# Patient Record
Sex: FEMALE | Race: WHITE | NOT HISPANIC OR LATINO
[De-identification: names, ages, dates, MRNs, and addresses within clinical notes are randomized per-mention and may not be internally consistent; named-entity substitution may affect disease eponyms.]

---

## 2022-06-16 PROBLEM — Z00.00 ENCOUNTER FOR PREVENTIVE HEALTH EXAMINATION: Status: ACTIVE | Noted: 2022-06-16

## 2022-06-30 ENCOUNTER — APPOINTMENT (OUTPATIENT)
Dept: ORTHOPEDIC SURGERY | Facility: CLINIC | Age: 37
End: 2022-06-30

## 2022-06-30 VITALS — BODY MASS INDEX: 25.11 KG/M2 | HEIGHT: 67 IN | WEIGHT: 160 LBS

## 2022-06-30 DIAGNOSIS — Z78.9 OTHER SPECIFIED HEALTH STATUS: ICD-10-CM

## 2022-06-30 DIAGNOSIS — M25.512 PAIN IN LEFT SHOULDER: ICD-10-CM

## 2022-06-30 PROCEDURE — 99455 WORK RELATED DISABILITY EXAM: CPT

## 2022-06-30 PROCEDURE — 99072 ADDL SUPL MATRL&STAF TM PHE: CPT

## 2022-07-01 PROBLEM — M25.512 ACUTE PAIN OF LEFT SHOULDER: Status: ACTIVE | Noted: 2022-06-30

## 2022-07-01 PROBLEM — Z78.9 NON-SMOKER: Status: ACTIVE | Noted: 2022-06-30

## 2022-07-01 NOTE — HISTORY OF PRESENT ILLNESS
[de-identified] : The patient is a 36 year old R hand dominant female who presents today complaining of left shoulder pain. \par Date of Injury/Onset: 11/9/20\par Pain: At Rest: 6/10\par With Activity: 7/10\par Mechanism of injury: At her job they have stocked boxes full of heavy objects such as milks. She pulled something out\par of her walk in fridge and the boxes came falling onto her L side of her body\par This is a Work Related Injury being treated under Worker's Compensation.\par This is not an athletic injury occurring associated with an interscholastic or organized sports team.\par Quality of symptoms: shooting pain, numbness in the hand\par Improves with: chiropractor, shock treatment \par Worse with: weather, sleeping, reaching behind, lifting objects\par Treatment/Imaging/Studies Since Last Visit: PT and MDP\par Reports Available For Review Today: None\par Out of work/sport: n/a\par School/Sport/Position/Occupation: working/ \par Change since last visit: Pain is more mild that last visit in january. \par Additional Information: More numbness than pain.

## 2022-07-01 NOTE — WORK
[Has the patient reached Maximum Medical Improvement? If yes, indicate date___] : Yes, on [unfilled] [Is there permanent partial impairment?] : Yes [FreeTextEntry6] : Left shoulder [Left] : left [Yes] : Yes [FreeTextEntry7] : Shoulder [FreeTextEntry8] : See above [de-identified] : Full [FreeTextEntry5] : 20% [de-identified] : Loss of motion, IR/F/AB 15%, pain and weakness 5%, total 20% [At the pre-injury job] : At the pre-injury job [___ lbs] : Frequently: [unfilled] lbs [] : Constantly [N/A] : N/A [Heavy Work] : Heavy work [Has the patient had an injury/illness since the date of injury which impacts residual functional capacity?] : No [Would the patient benefit from vocational rehabilitation? If Yes, explain below.] :  No

## 2022-07-01 NOTE — IMAGING
[de-identified] : The patient is a well appearing 37 year old F of their stated age.\par Neck is supple & nontender to palpation. + Spurling's left.\par \par Effected Shoulder:  \par Inspection:\par Scapula Winging: Negative\par Deformity: None\par Erythema: None\par Ecchymosis: None\par Abrasions: None\par Effusion: None\par \par Range of Motion:\par Active Forward Flexion: 150 degrees \par Active Abduction: 150 degrees \par Passive Forward Flexion: 175 degrees \par Passive Abduction: 175 degrees \par ER @ 90 degrees: 95 degrees\par IR @ 90 degrees: 15 degrees\par ER @ 0 degrees: 45 degrees\par \par Motor Exam:\par Forward Flexion: 5 out of 5\par Flexion Plane of Scapula: 5 out of 5\par Abduction: 4 out of 5\par Internal Rotation: 5 out of 5\par External Rotation: 5 out of 5\par Distal Motor Strength: 5 out of 5\par Stability Testing:\par Anterior: 1+\par Posterior: 1+\par Sulcus N: 1+\par Sulcus ER: 1+\par \par Provocative Tests:\par Drop Arm: Negative\par Impingement: +\par [place]: +\par X-Arm Adduction: Negative\par Belly Press: Negative\par Bear Hug: Negative\par Lift Off: Negative\par Apprehension: Negative\par Relocation: Negative\par Posterior Load & Shift: Negative\par Palpation:\par AC Joint: Nontender\par Clavicle: Nontender\par SC Joint: Nontender\par Bicepital Groove: Nontender\par Coracoid Process: Nontender\par Pectoralis Minor Tendon: Nontender\par Pectoralis Major Tendon: Nontender & palpably intact\par Latissimus Dorsi: Nontender \par Proximal Humerus: Nontender\par Scapula Body: Nontender\par Medial Scapula Boarder: Nontender\par Scapula Spine: Nontender\par \par Neurologic Exam: Sensation to Light Touch:\par Axillary: Grossly intact\par Ulnar: Grossly intact\par Radial: Grossly intact\par Median: Grossly intact\par Other:  N/A\par Circulatory/Pulses:\par Ulnar: 2+\par Radial: 2+\par Other Pertinent Findings: None\par \par Contralateral Shoulder\par Range of Motion:\par Active Forward Flexion: 180 degrees \par Active Abduction: 180 degrees \par Passive Forward Flexion: 180 degrees \par Passive Abduction: 180 degrees \par ER @ 90 degrees: 90 degrees\par IR @ 90 degrees: 45 degrees\par ER @ 0 degrees: 50 degrees\par Motor Exam:\par Forward Flexion: 5 out of 5\par Flexion Plane of Scapula: 5 out of 5\par Abduction: 5 out of 5\par Internal Rotation: 5 out of 5\par External Rotation: 5 out of 5\par Distal Motor Strength: 5 out of 5\par Stability Testing:\par Anterior: 1+\par Posterior: 1+\par Sulcus N: 1+\par Sulcus ER: 1+\par Other Pertinent Findings: None\par \par \par Assessment: The patient is a 37 year old F with left shoulder pain s/p work injury on 11/19/20. \par   \par The patient’s condition is acute\par Documents/Results Reviewed Today: none\par Tests/Studies Independently Interpreted Today: none\par Pertinent findings include: 150/150 active, 175/175 passive, 95/15/45, +IMP, +OB, 5/5 FF FSP ER IR, ABD 4/5\par Confounding medical conditions/concerns: none\par \par Plan:  Patient has reached maximum medical benefit and base on the [place] 2018 WC Guidelines, schedule loss of use of the left shoulder is 20%.  \par Tests Ordered: none\par Prescription Medications Ordered: none\par Braces/DME Ordered: none\par Activity/Work/Sports Status: working/ \par Additional Instructions: none\par Follow-Up: prn\par \par The patient's current medication management of their orthopedic diagnosis was reviewed today:\par (1) We discussed a comprehensive treatment plan that included possible pharmaceutical management involving the use of prescription strength medications including but not limited to options such as oral Naprosyn 500mg BID, once daily Meloxicam 15 mg, or 500-650 mg Tylenol versus over the counter oral medications and topical prescription NSAID Pennsaid vs over the counter Voltaren gel.\par (2) There is a moderate risk of morbidity with further treatment, especially from use of prescription strength medications and possible side effects of these medications which include upset stomach with oral medications, skin reactions to topical medications and cardiac/renal issues with long term use.\par (3) I recommended that the patient follow-up with their medical physician to discuss any significant specific potential issues with long term medication use such as interactions with current medications or with exacerbation of underlying medical comorbidities.\par (4) The benefits and risks associated with use of injectable, oral or topical, prescription and over the counter anti-inflammatory medications were discussed with the patient. The patient voiced understanding of the risks including but not limited to bleeding, stroke, kidney dysfunction, heart disease, and were referred to the black box warning label for further information.\par \par I, Stan Liu, attest that this documentation has been prepared under the direction and in the presence of Provider Dr. Jacobs\par \par The documentation recorded by the scribe accurately reflects the service I personally performed and the decisions made by me.\par

## 2023-03-17 ENCOUNTER — APPOINTMENT (OUTPATIENT)
Dept: BARIATRICS | Facility: CLINIC | Age: 38
End: 2023-03-17

## 2024-09-16 ENCOUNTER — NON-APPOINTMENT (OUTPATIENT)
Age: 39
End: 2024-09-16

## 2024-09-25 ENCOUNTER — NON-APPOINTMENT (OUTPATIENT)
Age: 39
End: 2024-09-25

## 2024-09-25 ENCOUNTER — APPOINTMENT (OUTPATIENT)
Dept: FAMILY MEDICINE | Facility: CLINIC | Age: 39
End: 2024-09-25
Payer: COMMERCIAL

## 2024-09-25 VITALS
DIASTOLIC BLOOD PRESSURE: 78 MMHG | WEIGHT: 179.25 LBS | HEIGHT: 67 IN | TEMPERATURE: 97.9 F | HEART RATE: 93 BPM | OXYGEN SATURATION: 97 % | BODY MASS INDEX: 28.13 KG/M2 | SYSTOLIC BLOOD PRESSURE: 123 MMHG

## 2024-09-25 DIAGNOSIS — Z23 ENCOUNTER FOR IMMUNIZATION: ICD-10-CM

## 2024-09-25 DIAGNOSIS — Z76.89 PERSONS ENCOUNTERING HEALTH SERVICES IN OTHER SPECIFIED CIRCUMSTANCES: ICD-10-CM

## 2024-09-25 PROCEDURE — 90715 TDAP VACCINE 7 YRS/> IM: CPT

## 2024-09-25 PROCEDURE — 99204 OFFICE O/P NEW MOD 45 MIN: CPT | Mod: 25

## 2024-09-25 PROCEDURE — 90471 IMMUNIZATION ADMIN: CPT

## 2024-09-25 NOTE — HISTORY OF PRESENT ILLNESS
[FreeTextEntry1] : feeling well presents  to establish care  and needs tdap sister is having a baby follows with GYN no routine medications  will RTO  for complete physical with fasting labs

## 2024-09-25 NOTE — HEALTH RISK ASSESSMENT
[Very Good] : ~his/her~  mood as very good [No falls in past year] : Patient reported no falls in the past year [0] : 2) Feeling down, depressed, or hopeless: Not at all (0) [PHQ-2 Negative - No further assessment needed] : PHQ-2 Negative - No further assessment needed